# Patient Record
Sex: FEMALE | Race: WHITE | NOT HISPANIC OR LATINO | URBAN - METROPOLITAN AREA
[De-identification: names, ages, dates, MRNs, and addresses within clinical notes are randomized per-mention and may not be internally consistent; named-entity substitution may affect disease eponyms.]

---

## 2024-03-12 ENCOUNTER — OFFICE VISIT (OUTPATIENT)
Dept: FAMILY MEDICINE | Facility: CLINIC | Age: 44
End: 2024-03-12
Payer: COMMERCIAL

## 2024-03-12 VITALS
OXYGEN SATURATION: 98 % | HEART RATE: 74 BPM | RESPIRATION RATE: 18 BRPM | SYSTOLIC BLOOD PRESSURE: 110 MMHG | DIASTOLIC BLOOD PRESSURE: 76 MMHG | TEMPERATURE: 98 F | BODY MASS INDEX: 21.31 KG/M2 | HEIGHT: 67 IN | WEIGHT: 135.81 LBS

## 2024-03-12 DIAGNOSIS — G43.109 MIGRAINE WITH AURA AND WITHOUT STATUS MIGRAINOSUS, NOT INTRACTABLE: Primary | ICD-10-CM

## 2024-03-12 DIAGNOSIS — Z12.31 ENCOUNTER FOR SCREENING MAMMOGRAM FOR MALIGNANT NEOPLASM OF BREAST: ICD-10-CM

## 2024-03-12 DIAGNOSIS — F41.9 ANXIETY AND DEPRESSION: ICD-10-CM

## 2024-03-12 DIAGNOSIS — F32.A ANXIETY AND DEPRESSION: ICD-10-CM

## 2024-03-12 DIAGNOSIS — F90.0 ATTENTION DEFICIT HYPERACTIVITY DISORDER (ADHD), PREDOMINANTLY INATTENTIVE TYPE: ICD-10-CM

## 2024-03-12 DIAGNOSIS — Z00.00 PREVENTATIVE HEALTH CARE: ICD-10-CM

## 2024-03-12 DIAGNOSIS — F51.01 PRIMARY INSOMNIA: ICD-10-CM

## 2024-03-12 PROBLEM — G43.909 MIGRAINE: Status: ACTIVE | Noted: 2024-03-12

## 2024-03-12 PROCEDURE — 1159F MED LIST DOCD IN RCRD: CPT | Mod: CPTII,S$GLB,, | Performed by: FAMILY MEDICINE

## 2024-03-12 PROCEDURE — 1160F RVW MEDS BY RX/DR IN RCRD: CPT | Mod: CPTII,S$GLB,, | Performed by: FAMILY MEDICINE

## 2024-03-12 PROCEDURE — 99999 PR PBB SHADOW E&M-NEW PATIENT-LVL IV: CPT | Mod: PBBFAC,,, | Performed by: FAMILY MEDICINE

## 2024-03-12 PROCEDURE — 3074F SYST BP LT 130 MM HG: CPT | Mod: CPTII,S$GLB,, | Performed by: FAMILY MEDICINE

## 2024-03-12 PROCEDURE — 3078F DIAST BP <80 MM HG: CPT | Mod: CPTII,S$GLB,, | Performed by: FAMILY MEDICINE

## 2024-03-12 PROCEDURE — 3008F BODY MASS INDEX DOCD: CPT | Mod: CPTII,S$GLB,, | Performed by: FAMILY MEDICINE

## 2024-03-12 PROCEDURE — 99204 OFFICE O/P NEW MOD 45 MIN: CPT | Mod: S$GLB,,, | Performed by: FAMILY MEDICINE

## 2024-03-12 RX ORDER — DEXTROAMPHETAMINE SACCHARATE, AMPHETAMINE ASPARTATE, DEXTROAMPHETAMINE SULFATE AND AMPHETAMINE SULFATE 5; 5; 5; 5 MG/1; MG/1; MG/1; MG/1
TABLET ORAL
COMMUNITY
End: 2024-03-12

## 2024-03-12 RX ORDER — DEXTROAMPHETAMINE SACCHARATE, AMPHETAMINE ASPARTATE, DEXTROAMPHETAMINE SULFATE AND AMPHETAMINE SULFATE 5; 5; 5; 5 MG/1; MG/1; MG/1; MG/1
1 TABLET ORAL DAILY
Qty: 30 TABLET | Refills: 0 | Status: SHIPPED | OUTPATIENT
Start: 2024-03-12 | End: 2024-05-01 | Stop reason: SDUPTHER

## 2024-03-12 RX ORDER — BUTALBITAL, ACETAMINOPHEN AND CAFFEINE 50; 325; 40 MG/1; MG/1; MG/1
1 TABLET ORAL EVERY 4 HOURS PRN
COMMUNITY
End: 2024-05-01 | Stop reason: SDUPTHER

## 2024-03-12 RX ORDER — ZOLPIDEM TARTRATE 10 MG
TABLET ORAL
COMMUNITY
End: 2024-05-01 | Stop reason: SDUPTHER

## 2024-03-12 RX ORDER — ALPRAZOLAM 0.5 MG/1
TABLET ORAL
COMMUNITY
End: 2024-05-01 | Stop reason: SDUPTHER

## 2024-03-12 RX ORDER — CITALOPRAM HYDROBROMIDE 20 MG
TABLET ORAL
COMMUNITY
End: 2024-05-01 | Stop reason: SDUPTHER

## 2024-03-12 NOTE — PROGRESS NOTES
CHIEF COMPLAINT:  This is a 43-year-old female here for first visit to establish care and requesting refill of medications.    SUBJECTIVE:  The patient has not had a PCP since she was seen by a pediatrician.  She has been followed for her medical conditions by her neurologist who recently retired.  She brings in her medical records today.  Patient has a history of migraines which occur 2 to 3 times a month.  She treats migraines with Fioricet as needed.  She has Arnold-Chiari malformation for which she had surgery in .      Patient has depression with anxiety for which she has taken Celexa 20 mg daily since .  She previously took Wellbutrin which did not work well.  She takes alprazolam 0.5 mg 1/2 tablet at bedtime in order to calm her mind to sleep.  She occasionally takes alprazolam 0.5 mg 1/2 tablet during the day if needed.  She also takes Ambien 10 mg nightly for insomnia.  Patient has been diagnosed with ADHD inattentive type since she was an elementary school.  She takes Adderall 20 mg daily.  She has been unable to fill Adderall recently and notes that Adderall helps with her depression.    Pap smear years ago.  No previous mammogram.  TD booster May 2007.  COVID-19 vaccine .      Past Medical History:   Diagnosis Date    ADHD (attention deficit hyperactivity disorder), inattentive type     Anxiety and depression     Arnold-Chiari malformation     Migraine     Primary insomnia        Past Surgical History:   Procedure Laterality Date    CERVICAL FUSION       SECTION, LOW TRANSVERSE      CRANIAL SURGERY      Arnold Chiari malformation       Social History     Socioeconomic History    Marital status:    Tobacco Use    Smoking status: Never    Smokeless tobacco: Never   Substance and Sexual Activity    Alcohol use: Yes     Alcohol/week: 21.0 standard drinks of alcohol     Types: 21 Cans of beer per week   Social History Narrative    The patient is  and has  one child. She teaches elementary school.      Social Determinants of Health     Financial Resource Strain: Low Risk  (3/6/2024)    Overall Financial Resource Strain (CARDIA)     Difficulty of Paying Living Expenses: Not hard at all   Food Insecurity: No Food Insecurity (3/6/2024)    Hunger Vital Sign     Worried About Running Out of Food in the Last Year: Never true     Ran Out of Food in the Last Year: Never true   Transportation Needs: No Transportation Needs (3/6/2024)    PRAPARE - Transportation     Lack of Transportation (Medical): No     Lack of Transportation (Non-Medical): No   Physical Activity: Insufficiently Active (3/6/2024)    Exercise Vital Sign     Days of Exercise per Week: 5 days     Minutes of Exercise per Session: 20 min   Stress: Stress Concern Present (3/6/2024)    [employer] - Occupational Stress Questionnaire     Feeling of Stress : Very much   Social Connections: Unknown (3/6/2024)    Social Connection and Isolation Panel [NHANES]     Frequency of Communication with Friends and Family: Three times a week     Frequency of Social Gatherings with Friends and Family: Once a week     Active Member of Clubs or Organizations: Yes     Attends Club or Organization Meetings: 1 to 4 times per year     Marital Status:    Housing Stability: Low Risk  (3/6/2024)    Housing Stability Vital Sign     Unable to Pay for Housing in the Last Year: No     Number of Places Lived in the Last Year: 1     Unstable Housing in the Last Year: No       Family History   Problem Relation Age of Onset    Alcohol abuse Mother     Cancer Mother         Ocular cancer    Alcohol abuse Father     Hypertension Father     Stroke Father     ADD / ADHD Sister     Alzheimer's disease Maternal Grandmother     Lung cancer Maternal Grandfather     Kidney failure Paternal Grandmother     Stroke Paternal Grandfather        ROS:  GENERAL: Patient denies fever, chills, night sweats.  Patient denies  weight gain or loss. Patient denies anorexia, fatigue, weakness or swollen glands.  SKIN: Patient denies rash or hair loss.  HEENT: Patient denies sore throat, ear pain, hearing loss, nasal congestion, or runny nose. Patient denies visual disturbance, eye irritation or discharge.  LUNGS: Patient denies cough, wheeze or hemoptysis.  CARDIOVASCULAR: Patient denies chest pain, shortness of breath, palpitations, syncope or lower extremity edema.  GI: Patient denies abdominal pain, nausea, vomiting, diarrhea, constipation, blood in stool or melena.  GENITOURINARY: Patient denies pelvic pain, vaginal discharge, itch or odor. Patient denies irregular vaginal bleeding.  Patient denies dysuria, frequency, hematuria, nocturia, urgency or incontinence.  BREASTS: Patient denies breast pain, mass or nipple discharge.  MUSCULOSKELETAL: Patient denies joint pain, swelling, redness or warmth.  NEUROLOGIC: Patient denies headache, vertigo, paresthesias, weakness in limb, dysarthria, dysphagia or abnormality of gait.  PSYCHIATRIC: Patient denies memory loss memory loss.  Positive for anxiety, depression and insomnia.    OBJECTIVE:   GENERAL: Well-developed well-nourished pleasant white female alert and oriented x3, in no acute distress.  Memory, judgment and cognition without deficit.  Normal affect.  No hallucinations, delusions or flight of ideas.  SKIN: Clear without rash.  Normal color and tone.  HEENT: Eyes: Clear conjunctivae. No scleral icterus.  Pupils equal reactive to light and accommodation.  Ears: Clear canals. Clear TMs.  Nose: Without congestion.  Pharynx: Without injection or exudates.  NECK: Supple, normal range of motion.  No masses, lymphadenopathy or enlarged thyroid.  No JVD.  Carotids 2+ and equal.  No bruits.  LUNGS: Clear to auscultation.  Normal respiratory effort.  CARDIOVASCULAR:  Regular rhythm, normal S1, S2 without murmur, gallop or rub.  ABDOMEN: Normal appearance.  Active bowel sounds.  Soft, nontender  without mass or organomegaly.  No rebound or guarding.  EXTREMITIES: Without cyanosis, clubbing or edema.  Distal pulses 2+ and equal.  Normal range of motion in all extremities.  No joint effusion, erythema or warmth.  NEUROLOGIC:  Motor strength equal bilaterally.  Sensation normal to touch.  Gait without abnormality.  No tremor.    Reviewed medical records from neurologist Dr. Gagan Sheehan.  Discussed recommended alcohol consumption for women.    ASSESSMENT:  1. Migraine with aura and without status migrainosus, not intractable    2. Anxiety and depression    3. Attention deficit hyperactivity disorder (ADHD), predominantly inattentive type    4. Primary insomnia    5. Encounter for screening mammogram for malignant neoplasm of breast    6. Preventative health care      PLAN:   1. Fasting lab.  2. Mammogram.    3. Refill Adderall.    4. Decrease alcohol consumption:  1 drink per day.  5. Exercise 150 minutes per week.  6. Age-appropriate counseling.    7. Return to clinic for preventive health exam and Pap/pelvic.    45 minutes of total time spent on the encounter, which includes face to face time and non-face to face time preparing to see the patient (eg, review of tests), Obtaining and/or reviewing separately obtained history, Documenting clinical information in the electronic or other health record, Independently interpreting results (not separately reported) and communicating results to the patient/family/caregiver, or Care coordination (not separately reported).     This note is generated with speech recognition software and is subject to transcription error and sound alike phrases that may be missed by proofreading.

## 2024-03-14 ENCOUNTER — HOSPITAL ENCOUNTER (OUTPATIENT)
Dept: RADIOLOGY | Facility: HOSPITAL | Age: 44
Discharge: HOME | End: 2024-03-14
Attending: FAMILY MEDICINE
Payer: COMMERCIAL

## 2024-03-14 DIAGNOSIS — Z12.31 ENCOUNTER FOR SCREENING MAMMOGRAM FOR MALIGNANT NEOPLASM OF BREAST: ICD-10-CM

## 2024-03-14 PROCEDURE — 77067 SCR MAMMO BI INCL CAD: CPT | Mod: TC,PN

## 2024-03-14 PROCEDURE — 77063 BREAST TOMOSYNTHESIS BI: CPT | Mod: 26,,, | Performed by: RADIOLOGY

## 2024-03-14 PROCEDURE — 77067 SCR MAMMO BI INCL CAD: CPT | Mod: 26,,, | Performed by: RADIOLOGY

## 2024-05-01 ENCOUNTER — OFFICE VISIT (OUTPATIENT)
Dept: FAMILY MEDICINE | Facility: CLINIC | Age: 44
End: 2024-05-01
Payer: COMMERCIAL

## 2024-05-01 VITALS
DIASTOLIC BLOOD PRESSURE: 72 MMHG | TEMPERATURE: 98 F | HEIGHT: 67 IN | SYSTOLIC BLOOD PRESSURE: 114 MMHG | WEIGHT: 132.94 LBS | HEART RATE: 90 BPM | BODY MASS INDEX: 20.87 KG/M2 | RESPIRATION RATE: 18 BRPM | OXYGEN SATURATION: 97 %

## 2024-05-01 DIAGNOSIS — F41.9 ANXIETY AND DEPRESSION: ICD-10-CM

## 2024-05-01 DIAGNOSIS — Z12.4 CERVICAL CANCER SCREENING: ICD-10-CM

## 2024-05-01 DIAGNOSIS — F32.A ANXIETY AND DEPRESSION: ICD-10-CM

## 2024-05-01 DIAGNOSIS — Z76.0 MEDICATION REFILL: ICD-10-CM

## 2024-05-01 DIAGNOSIS — F90.0 ATTENTION DEFICIT HYPERACTIVITY DISORDER (ADHD), PREDOMINANTLY INATTENTIVE TYPE: ICD-10-CM

## 2024-05-01 DIAGNOSIS — F51.01 PRIMARY INSOMNIA: ICD-10-CM

## 2024-05-01 DIAGNOSIS — Z00.00 PREVENTATIVE HEALTH CARE: Primary | ICD-10-CM

## 2024-05-01 DIAGNOSIS — G43.109 MIGRAINE WITH AURA AND WITHOUT STATUS MIGRAINOSUS, NOT INTRACTABLE: ICD-10-CM

## 2024-05-01 PROCEDURE — 3008F BODY MASS INDEX DOCD: CPT | Mod: CPTII,S$GLB,, | Performed by: REGISTERED NURSE

## 2024-05-01 PROCEDURE — 99999 PR PBB SHADOW E&M-EST. PATIENT-LVL III: CPT | Mod: PBBFAC,,, | Performed by: REGISTERED NURSE

## 2024-05-01 PROCEDURE — 3078F DIAST BP <80 MM HG: CPT | Mod: CPTII,S$GLB,, | Performed by: REGISTERED NURSE

## 2024-05-01 PROCEDURE — 3074F SYST BP LT 130 MM HG: CPT | Mod: CPTII,S$GLB,, | Performed by: REGISTERED NURSE

## 2024-05-01 PROCEDURE — 87624 HPV HI-RISK TYP POOLED RSLT: CPT | Performed by: REGISTERED NURSE

## 2024-05-01 PROCEDURE — 99396 PREV VISIT EST AGE 40-64: CPT | Mod: S$GLB,,, | Performed by: REGISTERED NURSE

## 2024-05-01 PROCEDURE — 88175 CYTOPATH C/V AUTO FLUID REDO: CPT | Performed by: REGISTERED NURSE

## 2024-05-01 RX ORDER — ZOLPIDEM TARTRATE 10 MG
10 TABLET ORAL NIGHTLY PRN
Qty: 30 TABLET | Refills: 2 | Status: SHIPPED | OUTPATIENT
Start: 2024-05-01

## 2024-05-01 RX ORDER — BUTALBITAL, ACETAMINOPHEN AND CAFFEINE 50; 325; 40 MG/1; MG/1; MG/1
1 TABLET ORAL EVERY 6 HOURS PRN
Qty: 30 TABLET | Refills: 1 | Status: SHIPPED | OUTPATIENT
Start: 2024-05-01

## 2024-05-01 RX ORDER — DEXTROAMPHETAMINE SACCHARATE, AMPHETAMINE ASPARTATE, DEXTROAMPHETAMINE SULFATE AND AMPHETAMINE SULFATE 5; 5; 5; 5 MG/1; MG/1; MG/1; MG/1
1 TABLET ORAL DAILY
Qty: 30 TABLET | Refills: 0 | Status: SHIPPED | OUTPATIENT
Start: 2024-07-01

## 2024-05-01 RX ORDER — CITALOPRAM HYDROBROMIDE 20 MG
20 TABLET ORAL DAILY
Qty: 30 TABLET | Refills: 6 | Status: SHIPPED | OUTPATIENT
Start: 2024-05-01

## 2024-05-01 RX ORDER — DEXTROAMPHETAMINE SACCHARATE, AMPHETAMINE ASPARTATE, DEXTROAMPHETAMINE SULFATE AND AMPHETAMINE SULFATE 5; 5; 5; 5 MG/1; MG/1; MG/1; MG/1
1 TABLET ORAL DAILY
Qty: 30 TABLET | Refills: 0 | Status: SHIPPED | OUTPATIENT
Start: 2024-06-01

## 2024-05-01 RX ORDER — DEXTROAMPHETAMINE SACCHARATE, AMPHETAMINE ASPARTATE, DEXTROAMPHETAMINE SULFATE AND AMPHETAMINE SULFATE 5; 5; 5; 5 MG/1; MG/1; MG/1; MG/1
1 TABLET ORAL DAILY
Qty: 30 TABLET | Refills: 0 | Status: SHIPPED | OUTPATIENT
Start: 2024-05-01

## 2024-05-01 RX ORDER — ALPRAZOLAM 0.5 MG/1
0.5 TABLET ORAL DAILY PRN
Qty: 30 TABLET | Refills: 2 | Status: SHIPPED | OUTPATIENT
Start: 2024-05-01

## 2024-05-01 NOTE — PROGRESS NOTES
"SUBJECTIVE:     Oralia Cruz  MRN:  [id number]  43 y.o. female    CHIEF COMPLAINT:   PREVENTATIVE HEALTH EXAM    HPI:    Oralia Cruz is here for her annual wellness exam, has fasted today for bloodwork.  I have reviewed the patient's medical history in detail and updated the computerized patient record.  History obtained from the patient.    HEALTHCARE MAINTENANCE:    COMPLETED:  Health Maintenance Topics with due status: Not Due       Topic Last Completion Date    Mammogram 03/14/2024    Influenza Vaccine Not Due       DUE:  Health Maintenance Due   Topic Date Due    Cervical Cancer Screening  Never done    TETANUS VACCINE  03/11/2017    COVID-19 Vaccine (3 - 2023-24 season) 09/01/2023         REVIEW OF SYSTEMS:  Review of Systems   Constitutional:  Negative for activity change, appetite change, chills, diaphoresis, fatigue, fever and unexpected weight change.        Wt Readings from Last 2 Encounters:  05/01/24 0956 : 60.3 kg (132 lb 15 oz)  03/12/24 1550 : 61.6 kg (135 lb 12.9 oz)   HENT: Negative.     Eyes:  Negative for discharge and visual disturbance.   Respiratory:  Negative for cough, shortness of breath and wheezing.    Cardiovascular:  Negative for chest pain, palpitations and leg swelling.   Gastrointestinal: Negative.    Genitourinary: Negative.    Musculoskeletal: Negative.    Integumentary:  Negative for rash and mole/lesion.   Neurological:  Positive for headaches (tx with Fioricet as needed, takes med ~ 2 times per month). Negative for vertigo, seizures, syncope and numbness.        Req med refills on chronic meds currently taking.  Has been on several years per Neurology but he has since retired and she needs refills.   Hematological: Negative.    Psychiatric/Behavioral:  Positive for depression and sleep disturbance. The patient is nervous/anxious.         On medication as ordered, working well.  Also taking ADD medication chronically, doing well with focus/concentration and "to keep my " "mind straight".   Breast: negative.        ALLERGIES:  Review of patient's allergies indicates:  No Known Allergies      CURRENT PROBLEM LIST:  Patient Active Problem List   Diagnosis    Migraine    Anxiety and depression    Attention deficit hyperactivity disorder (ADHD), predominantly inattentive type    Primary insomnia       CURRENT MEDICATIONS:    Current Outpatient Medications:     AMBIEN 10 mg Tab, Take 1 tablet (10 mg total) by mouth nightly as needed (for sleep)., Disp: 30 tablet, Rfl: 2    butalbital-acetaminophen-caffeine -40 mg (FIORICET, ESGIC) -40 mg per tablet, Take 1 tablet by mouth every 6 (six) hours as needed for Headaches., Disp: 30 tablet, Rfl: 1    CELEXA 20 mg tablet, Take 1 tablet (20 mg total) by mouth once daily., Disp: 30 tablet, Rfl: 6    dextroamphetamine-amphetamine (ADDERALL) 20 mg tablet, Take 1 tablet by mouth once daily., Disp: 30 tablet, Rfl: 0    XANAX 0.5 mg tablet, Take 1 tablet (0.5 mg total) by mouth daily as needed for Anxiety or Insomnia., Disp: 30 tablet, Rfl: 2      HISTORY:    PAST MEDICAL HISTORY:  Past Medical History:   Diagnosis Date    ADHD (attention deficit hyperactivity disorder), inattentive type     Anxiety and depression     Arnold-Chiari malformation     Migraine     Primary insomnia        PAST SURGICAL HISTORY:  Past Surgical History:   Procedure Laterality Date    CERVICAL FUSION       SECTION, LOW TRANSVERSE      CRANIAL SURGERY      Arnold Chiari malformation       FAMILY HISTORY:  Family History   Problem Relation Name Age of Onset    Alcohol abuse Mother      Cancer Mother          Ocular cancer    Alcohol abuse Father      Hypertension Father      Stroke Father      ADD / ADHD Sister      Alzheimer's disease Maternal Grandmother      Lung cancer Maternal Grandfather      Kidney failure Paternal Grandmother      Stroke Paternal Grandfather         SOCIAL HISTORY:  Social History     Socioeconomic History    Marital status: " "   Tobacco Use    Smoking status: Never    Smokeless tobacco: Never   Substance and Sexual Activity    Alcohol use: Yes     Alcohol/week: 21.0 standard drinks of alcohol     Types: 21 Cans of beer per week    Drug use: Never   Social History Narrative    The patient is  and has one child. She teaches elementary school.      Social Determinants of Health     Financial Resource Strain: Low Risk  (3/6/2024)    Overall Financial Resource Strain (CARDIA)     Difficulty of Paying Living Expenses: Not hard at all   Food Insecurity: No Food Insecurity (3/6/2024)    Hunger Vital Sign     Worried About Running Out of Food in the Last Year: Never true     Ran Out of Food in the Last Year: Never true   Transportation Needs: No Transportation Needs (3/6/2024)    PRAPARE - Transportation     Lack of Transportation (Medical): No     Lack of Transportation (Non-Medical): No   Physical Activity: Insufficiently Active (3/6/2024)    Exercise Vital Sign     Days of Exercise per Week: 5 days     Minutes of Exercise per Session: 20 min   Stress: Stress Concern Present (3/6/2024)    [employer] - Occupational Stress Questionnaire     Feeling of Stress : Very much   Housing Stability: Low Risk  (3/6/2024)    Housing Stability Vital Sign     Unable to Pay for Housing in the Last Year: No     Number of Places Lived in the Last Year: 1     Unstable Housing in the Last Year: No       OBJECTIVE:     VITAL SIGNS:  Vitals:    05/01/24 0956   BP: 114/72   Pulse: 90   Resp: 18   Temp: 97.7 °F (36.5 °C)   TempSrc: Oral   SpO2: 97%   Weight: 60.3 kg (132 lb 15 oz)   Height: 5' 7" (1.702 m)       CURRENT BMI:   Body mass index is 20.82 kg/m².    PHYSICAL EXAM:  Physical Exam  Constitutional:       General: She is not in acute distress.     Appearance: She is well-developed. She is not diaphoretic.   HENT:      Head: Normocephalic and atraumatic.      Right Ear: Tympanic membrane, ear canal and external ear " normal. There is no impacted cerumen.      Left Ear: Tympanic membrane, ear canal and external ear normal. There is no impacted cerumen.      Nose: Nose normal. No nasal deformity, mucosal edema, congestion or rhinorrhea.      Mouth/Throat:      Mouth: Mucous membranes are moist. Mucous membranes are not dry and not cyanotic. No oral lesions.      Dentition: Normal dentition.      Pharynx: Oropharynx is clear. Uvula midline. No oropharyngeal exudate, posterior oropharyngeal erythema or uvula swelling.      Tonsils: No tonsillar abscesses.   Eyes:      General: Lids are normal. Lids are everted, no foreign bodies appreciated. No scleral icterus.        Right eye: No discharge.         Left eye: No discharge.      Conjunctiva/sclera: Conjunctivae normal.      Right eye: Right conjunctiva is not injected. No exudate.     Left eye: Left conjunctiva is not injected. No exudate.     Pupils: Pupils are equal, round, and reactive to light.   Neck:      Thyroid: No thyroid mass or thyromegaly.      Vascular: No carotid bruit or JVD.      Trachea: No tracheal deviation.   Cardiovascular:      Rate and Rhythm: Normal rate and regular rhythm.      Pulses: Normal pulses.      Heart sounds: Normal heart sounds. No murmur heard.     No friction rub. No gallop.   Pulmonary:      Effort: Pulmonary effort is normal. No respiratory distress.      Breath sounds: Normal breath sounds. No stridor. No wheezing.   Chest:      Chest wall: No tenderness.   Abdominal:      General: Bowel sounds are normal. There is no distension.      Palpations: Abdomen is soft. There is no mass.      Tenderness: There is no abdominal tenderness. There is no guarding or rebound.   Genitourinary:     Vagina: No vaginal discharge, erythema or bleeding.      Cervix: Normal. No cervical motion tenderness, discharge, friability, lesion, erythema or cervical bleeding.      Uterus: Normal.       Adnexa: Right adnexa normal and left adnexa normal.      Rectum:  Normal.   Musculoskeletal:         General: No swelling, tenderness or deformity. Normal range of motion.      Cervical back: Normal range of motion and neck supple. No edema or erythema. Normal range of motion.   Lymphadenopathy:      Cervical: No cervical adenopathy.   Skin:     General: Skin is warm and dry.      Capillary Refill: Capillary refill takes less than 2 seconds.      Coloration: Skin is not pale.      Findings: No bruising, erythema or rash.   Neurological:      Mental Status: She is alert and oriented to person, place, and time.      Sensory: No sensory deficit.      Motor: No weakness, tremor, atrophy or abnormal muscle tone.      Coordination: Coordination normal.      Gait: Gait normal.      Deep Tendon Reflexes: Reflexes are normal and symmetric.   Psychiatric:         Mood and Affect: Mood normal.         Speech: Speech normal.         Behavior: Behavior normal.         Thought Content: Thought content normal.         Cognition and Memory: Memory is not impaired.         Judgment: Judgment normal.         ~~~~~~~~~~~~~~~~~~~~~~~~~~~~~~~~~~~~~~~~~~~~~~~    LABS REVIEWED:    CBC:  Lab Results   Component Value Date    WBC 3.57 (L) 03/14/2024    RBC 3.98 (L) 03/14/2024    HGB 12.8 03/14/2024    HCT 37.7 03/14/2024    MCV 95 03/14/2024    MCH 32.2 (H) 03/14/2024    MCHC 34.0 03/14/2024    RDW 12.2 03/14/2024     03/14/2024    MPV 10.4 03/14/2024    GRAN 1.7 (L) 03/14/2024    GRAN 46.7 03/14/2024    LYMPH 1.5 03/14/2024    LYMPH 41.2 03/14/2024    MONO 0.4 03/14/2024    MONO 10.4 03/14/2024    EOS 0.0 03/14/2024    BASO 0.04 03/14/2024    EOSINOPHIL 0.6 03/14/2024    BASOPHIL 1.1 03/14/2024       CHEMISTRY:  Sodium   Date Value Ref Range Status   03/14/2024 138 136 - 145 mmol/L Final     Potassium   Date Value Ref Range Status   03/14/2024 3.9 3.5 - 5.1 mmol/L Final     Chloride   Date Value Ref Range Status   03/14/2024 104 95 - 110 mmol/L Final     CO2   Date Value Ref Range Status    03/14/2024 23 23 - 29 mmol/L Final     Glucose   Date Value Ref Range Status   03/14/2024 80 70 - 110 mg/dL Final     BUN   Date Value Ref Range Status   03/14/2024 11 6 - 20 mg/dL Final     Creatinine   Date Value Ref Range Status   03/14/2024 0.8 0.5 - 1.4 mg/dL Final     Calcium   Date Value Ref Range Status   03/14/2024 9.5 8.7 - 10.5 mg/dL Final     Total Protein   Date Value Ref Range Status   03/14/2024 6.9 6.0 - 8.4 g/dL Final     Albumin   Date Value Ref Range Status   03/14/2024 4.2 3.5 - 5.2 g/dL Final     Total Bilirubin   Date Value Ref Range Status   03/14/2024 0.8 0.1 - 1.0 mg/dL Final     Comment:     For infants and newborns, interpretation of results should be based  on gestational age, weight and in agreement with clinical  observations.    Premature Infant recommended reference ranges:  Up to 24 hours.............<8.0 mg/dL  Up to 48 hours............<12.0 mg/dL  3-5 days..................<15.0 mg/dL  6-29 days.................<15.0 mg/dL       Alkaline Phosphatase   Date Value Ref Range Status   03/14/2024 42 (L) 55 - 135 U/L Final     AST   Date Value Ref Range Status   03/14/2024 16 10 - 40 U/L Final     ALT   Date Value Ref Range Status   03/14/2024 8 (L) 10 - 44 U/L Final     Anion Gap   Date Value Ref Range Status   03/14/2024 11 8 - 16 mmol/L Final     eGFR   Date Value Ref Range Status   03/14/2024 >60.0 >60 mL/min/1.73 m^2 Final       LIPID PANEL:  Lab Results   Component Value Date    CHOL 190 03/14/2024     Lab Results   Component Value Date    TRIG 62 03/14/2024     Lab Results   Component Value Date    HDL 63 03/14/2024     Lab Results   Component Value Date    LDLCALC 114.6 03/14/2024       THYROID:  Lab Results   Component Value Date    TSH 1.973 03/14/2024         ASSESSMENT:     1. Preventative health care  -     Liquid-Based Pap Smear, Screening  -     HPV High Risk Genotypes, PCR    2. Anxiety and depression ---- chronic issue on med & doing well, refilled  -     CELEXA 20 mg  tablet; Take 1 tablet (20 mg total) by mouth once daily.  Dispense: 30 tablet; Refill: 6  -     XANAX 0.5 mg tablet; Take 1 tablet (0.5 mg total) by mouth daily as needed for Anxiety or Insomnia.  Dispense: 30 tablet; Refill: 2    3. Primary insomnia ---- takes chronically, working well, refilled  -     AMBIEN 10 mg Tab; Take 1 tablet (10 mg total) by mouth nightly as needed (for sleep).  Dispense: 30 tablet; Refill: 2    4. Attention deficit hyperactivity disorder (ADHD), predominantly inattentive type ---- takes once daily w/out any reported s/e, has been on chronically and doing well  -     dextroamphetamine-amphetamine (ADDERALL) 20 mg tablet; Take 1 tablet by mouth once daily.  Dispense: 30 tablet; Refill: 0  -     dextroamphetamine-amphetamine (ADDERALL) 20 mg tablet; Take 1 tablet by mouth once daily.  Dispense: 30 tablet; Refill: 0  -     dextroamphetamine-amphetamine (ADDERALL) 20 mg tablet; Take 1 tablet by mouth once daily.  Dispense: 30 tablet; Refill: 0    5. Migraine with aura and without status migrainosus, not intractable ---- takes about twice per month, nothing excessive  -     butalbital-acetaminophen-caffeine -40 mg (FIORICET, ESGIC) -40 mg per tablet; Take 1 tablet by mouth every 6 (six) hours as needed for Headaches.  Dispense: 30 tablet; Refill: 1    6. Cervical cancer screening  -     Liquid-Based Pap Smear, Screening  -     HPV High Risk Genotypes, PCR    7. Medication refill  -     AMBIEN 10 mg Tab; Take 1 tablet (10 mg total) by mouth nightly as needed (for sleep).  Dispense: 30 tablet; Refill: 2  -     butalbital-acetaminophen-caffeine -40 mg (FIORICET, ESGIC) -40 mg per tablet; Take 1 tablet by mouth every 6 (six) hours as needed for Headaches.  Dispense: 30 tablet; Refill: 1  -     CELEXA 20 mg tablet; Take 1 tablet (20 mg total) by mouth once daily.  Dispense: 30 tablet; Refill: 6  -     XANAX 0.5 mg tablet; Take 1 tablet (0.5 mg total) by mouth daily as needed  for Anxiety or Insomnia.  Dispense: 30 tablet; Refill: 2  -     dextroamphetamine-amphetamine (ADDERALL) 20 mg tablet; Take 1 tablet by mouth once daily.  Dispense: 30 tablet; Refill: 0  -     dextroamphetamine-amphetamine (ADDERALL) 20 mg tablet; Take 1 tablet by mouth once daily.  Dispense: 30 tablet; Refill: 0  -     dextroamphetamine-amphetamine (ADDERALL) 20 mg tablet; Take 1 tablet by mouth once daily.  Dispense: 30 tablet; Refill: 0      PLAN:     Healthy dietary and lifestyle changes.  Defer lab today, last done 3/2024.  Medications refilled, no red flags per .  Pap pending.  See PCP for ADD follow-up in 3 months.  RTC as directed and/or prn.        SHEILA Hodges  [facility]       Patient Care Team:  Andreea Ferrer MD as PCP - General (Family Medicine)  Sumanth Kingsley NP as Nurse Practitioner (Family Medicine)

## 2024-11-06 NOTE — PROGRESS NOTES
Patient ID: Oralia Cruz is a 44 y.o. female.    Chief Complaint: Follow-up and Medication Refill      History of Present Illness    - Ms. Cruz presents for follow-up and medication refills.    Ms. Cruz presents for follow-up, initially believing it had been 6 months since her last visit. Upon review, it was determined that it had actually been slightly over 6 months since her last appointment in March. Ms. Cruz reports tolerating her current medication doses well. She indicates being able to obtain her medication only at the [facility] at the [facility], which she describes as being located on a small [address] near the [facility]. Ms. Cruz requests refills for Adderall, Celexa, and Ambien.      ROS:  General: denies fever, denies chills, denies fatigue, denies weight gain, denies weight loss  Eyes: denies vision changes, denies redness, denies discharge  ENT: denies ear pain, denies nasal congestion, denies sore throat  Cardiovascular: denies chest pain, denies palpitations, denies lower extremity edema  Respiratory: denies cough, denies shortness of breath  Gastrointestinal: denies abdominal pain, denies nausea, denies vomiting, denies diarrhea, denies constipation, denies blood in stool  Genitourinary: denies dysuria, denies hematuria, denies frequency  Musculoskeletal: denies joint pain, denies muscle pain  Skin: denies rash, denies lesion  Neurological: denies headache, denies dizziness, denies numbness, denies tingling  Psychiatric: denies anxiety, denies depression, denies sleep difficulty            Physical Exam    General: In no acute distress.  Head: Normocephalic. Non traumatic.  Eyes: PERRLA. EOMs full. Conjunctivae clear. Fundi grossly normal.  Ears: EACs clear. TMs normal.  Nose: Mucosa pink. Mucosa moist. No obstruction.  Throat: Clear. No exudates. No lesions.  Neck: Supple. No masses. No thyromegaly. No bruits.  Chest: Lungs clear. No rales. No rhonchi. No wheezes.  Heart:  "RRR. No murmurs. No rubs. No gallops.  Abdomen: Soft. No tenderness. No masses. BS normal.  : Normal external genitalia. No lesions. No discharge. No hernias  noted.  Back: Normal curvature. No scoliosis. No tenderness.  Extremities: Warm. Well perfused. No upper extremity edema. No lower extremity edema. FROM. No deformities. No joint erythema.  Neuro: No focal deficits appreciated. Good muscle tone. Normal response to visual stimuli. Normal response to auditory stimuli.  Skin: Normal. No rashes. No lesions noted.            Current Outpatient Medications:     butalbital-acetaminophen-caffeine -40 mg (FIORICET, ESGIC) -40 mg per tablet, Take 1 tablet by mouth every 6 (six) hours as needed for Headaches., Disp: 30 tablet, Rfl: 1    XANAX 0.5 mg tablet, Take 1 tablet (0.5 mg total) by mouth daily as needed for Anxiety or Insomnia., Disp: 30 tablet, Rfl: 2    AMBIEN 10 mg Tab, Take 1 tablet (10 mg total) by mouth nightly as needed (for sleep)., Disp: 30 tablet, Rfl: 2    CELEXA 20 mg tablet, Take 1 tablet (20 mg total) by mouth once daily., Disp: 30 tablet, Rfl: 6    dextroamphetamine-amphetamine (ADDERALL) 20 mg tablet, Take 1 tablet by mouth once daily., Disp: 30 tablet, Rfl: 0            VITAL SIGNS:  Vitals:    11/07/24 1444   BP: 106/68   Pulse: 88   Resp: 18   Temp: 97.9 °F (36.6 °C)   Weight: 58.1 kg (128 lb 1.4 oz)   Height: 5' 7" (1.702 m)       CURRENT BMI:   Body mass index is 20.06 kg/m².    LABS REVIEWED:    CBC:  Lab Results   Component Value Date    WBC 3.57 (L) 03/14/2024    RBC 3.98 (L) 03/14/2024    HGB 12.8 03/14/2024    HCT 37.7 03/14/2024    MCV 95 03/14/2024    MCH 32.2 (H) 03/14/2024    MCHC 34.0 03/14/2024    RDW 12.2 03/14/2024     03/14/2024    MPV 10.4 03/14/2024    GRAN 1.7 (L) 03/14/2024    GRAN 46.7 03/14/2024    LYMPH 1.5 03/14/2024    LYMPH 41.2 03/14/2024    MONO 0.4 03/14/2024    MONO 10.4 03/14/2024    EOS 0.0 03/14/2024    BASO 0.04 03/14/2024    EOSINOPHIL 0.6 " 03/14/2024    BASOPHIL 1.1 03/14/2024       CHEMISTRY:  Sodium   Date Value Ref Range Status   03/14/2024 138 136 - 145 mmol/L Final     Potassium   Date Value Ref Range Status   03/14/2024 3.9 3.5 - 5.1 mmol/L Final     Chloride   Date Value Ref Range Status   03/14/2024 104 95 - 110 mmol/L Final     CO2   Date Value Ref Range Status   03/14/2024 23 23 - 29 mmol/L Final     Glucose   Date Value Ref Range Status   03/14/2024 80 70 - 110 mg/dL Final     BUN   Date Value Ref Range Status   03/14/2024 11 6 - 20 mg/dL Final     Creatinine   Date Value Ref Range Status   03/14/2024 0.8 0.5 - 1.4 mg/dL Final     Calcium   Date Value Ref Range Status   03/14/2024 9.5 8.7 - 10.5 mg/dL Final     Total Protein   Date Value Ref Range Status   03/14/2024 6.9 6.0 - 8.4 g/dL Final     Albumin   Date Value Ref Range Status   03/14/2024 4.2 3.5 - 5.2 g/dL Final     Total Bilirubin   Date Value Ref Range Status   03/14/2024 0.8 0.1 - 1.0 mg/dL Final     Comment:     For infants and newborns, interpretation of results should be based  on gestational age, weight and in agreement with clinical  observations.    Premature Infant recommended reference ranges:  Up to 24 hours.............<8.0 mg/dL  Up to 48 hours............<12.0 mg/dL  3-5 days..................<15.0 mg/dL  6-29 days.................<15.0 mg/dL       Alkaline Phosphatase   Date Value Ref Range Status   03/14/2024 42 (L) 55 - 135 U/L Final     AST   Date Value Ref Range Status   03/14/2024 16 10 - 40 U/L Final     ALT   Date Value Ref Range Status   03/14/2024 8 (L) 10 - 44 U/L Final     Anion Gap   Date Value Ref Range Status   03/14/2024 11 8 - 16 mmol/L Final     eGFR   Date Value Ref Range Status   03/14/2024 >60.0 >60 mL/min/1.73 m^2 Final       LIPID PANEL:  Lab Results   Component Value Date    CHOL 190 03/14/2024     Lab Results   Component Value Date    TRIG 62 03/14/2024     Lab Results   Component Value Date    HDL 63 03/14/2024     Lab Results   Component  "Value Date    LDLCALC 114.6 03/14/2024       THYROID:  Lab Results   Component Value Date    TSH 1.973 03/14/2024       DIABETES:  No results found for: "LABA1C", "HGBA1C"  No results found for: "MICALBCREAT"    Assessment and Plan     Assessment & Plan    ATTENTION DEFICIT HYPERACTIVITY DISORDER (ADHD):  - Continued Adderall, refilled.    DEPRESSION:  - Continued Celexa, refilled.    INSOMNIA:  - Continued Ambien, refilled.    MEDICATIONS/SUPPLEMENTS:  - Assessed patient's current medication regimen and determined refills were appropriate.  - Evaluated patient's ability to obtain medications, noting [facility] at [facility] as the source.    OTHER INSTRUCTIONS:  - Conducted brief physical exam, including lung auscultation.    FOLLOW UP:  - Follow up in 6 months for medication review and refills.          1. Attention deficit hyperactivity disorder (ADHD), predominantly inattentive type  Comments:  Will refill Adderall  Orders:  -     dextroamphetamine-amphetamine (ADDERALL) 20 mg tablet; Take 1 tablet by mouth once daily.  Dispense: 30 tablet; Refill: 0    2. Medication refill  Comments:  Will refill medications  Orders:  -     dextroamphetamine-amphetamine (ADDERALL) 20 mg tablet; Take 1 tablet by mouth once daily.  Dispense: 30 tablet; Refill: 0  -     CELEXA 20 mg tablet; Take 1 tablet (20 mg total) by mouth once daily.  Dispense: 30 tablet; Refill: 6  -     AMBIEN 10 mg Tab; Take 1 tablet (10 mg total) by mouth nightly as needed (for sleep).  Dispense: 30 tablet; Refill: 2    3. Anxiety and depression  Comments:  Will refill Celexa  Orders:  -     CELEXA 20 mg tablet; Take 1 tablet (20 mg total) by mouth once daily.  Dispense: 30 tablet; Refill: 6    4. Primary insomnia  Comments:  Will refill Ambien  Orders:  -     AMBIEN 10 mg Tab; Take 1 tablet (10 mg total) by mouth nightly as needed (for sleep).  Dispense: 30 tablet; Refill: 2           No follow-ups on file.  34 of total time spent on the encounter, " which includes face to face time and non-face to face time preparing to see the patient. This includes obtaining and/or reviewing separately obtained history, performing a medically appropriate examination and/or evaluation, and counseling and educating the patient/family/caregiver. Includes documenting clinical information in the electronic or other health record, independently interpreting results (not separately reported) and communicating results to the patient/family/caregiver, with care coordination (not separately reported). Medications, tests and/or procedures ordered as necessary along with referring and communicating with other health professionals (when not separately reported).      This note was generated with the assistance of ambient listening technology. Verbal consent was obtained by the patient and accompanying visitor(s) for the recording of patient appointment to facilitate this note. I attest to having reviewed and edited the generated note for accuracy, though some syntax or spelling errors may persist. Please contact the author of this note for any clarification.

## 2024-11-07 ENCOUNTER — OFFICE VISIT (OUTPATIENT)
Dept: FAMILY MEDICINE | Facility: CLINIC | Age: 44
End: 2024-11-07
Payer: COMMERCIAL

## 2024-11-07 VITALS
HEART RATE: 88 BPM | DIASTOLIC BLOOD PRESSURE: 68 MMHG | HEIGHT: 67 IN | SYSTOLIC BLOOD PRESSURE: 106 MMHG | RESPIRATION RATE: 18 BRPM | WEIGHT: 128.06 LBS | BODY MASS INDEX: 20.1 KG/M2 | TEMPERATURE: 98 F

## 2024-11-07 DIAGNOSIS — Z76.0 MEDICATION REFILL: ICD-10-CM

## 2024-11-07 DIAGNOSIS — F41.9 ANXIETY AND DEPRESSION: ICD-10-CM

## 2024-11-07 DIAGNOSIS — F32.A ANXIETY AND DEPRESSION: ICD-10-CM

## 2024-11-07 DIAGNOSIS — F51.01 PRIMARY INSOMNIA: ICD-10-CM

## 2024-11-07 DIAGNOSIS — F90.0 ATTENTION DEFICIT HYPERACTIVITY DISORDER (ADHD), PREDOMINANTLY INATTENTIVE TYPE: ICD-10-CM

## 2024-11-07 PROCEDURE — 3078F DIAST BP <80 MM HG: CPT | Mod: CPTII,S$GLB,, | Performed by: INTERNAL MEDICINE

## 2024-11-07 PROCEDURE — 99214 OFFICE O/P EST MOD 30 MIN: CPT | Mod: S$GLB,,, | Performed by: INTERNAL MEDICINE

## 2024-11-07 PROCEDURE — 3074F SYST BP LT 130 MM HG: CPT | Mod: CPTII,S$GLB,, | Performed by: INTERNAL MEDICINE

## 2024-11-07 PROCEDURE — G2211 COMPLEX E/M VISIT ADD ON: HCPCS | Mod: S$GLB,,, | Performed by: INTERNAL MEDICINE

## 2024-11-07 PROCEDURE — 1159F MED LIST DOCD IN RCRD: CPT | Mod: CPTII,S$GLB,, | Performed by: INTERNAL MEDICINE

## 2024-11-07 PROCEDURE — 99999 PR PBB SHADOW E&M-EST. PATIENT-LVL III: CPT | Mod: PBBFAC,,, | Performed by: INTERNAL MEDICINE

## 2024-11-07 PROCEDURE — 3008F BODY MASS INDEX DOCD: CPT | Mod: CPTII,S$GLB,, | Performed by: INTERNAL MEDICINE

## 2024-11-07 RX ORDER — DEXTROAMPHETAMINE SACCHARATE, AMPHETAMINE ASPARTATE, DEXTROAMPHETAMINE SULFATE AND AMPHETAMINE SULFATE 5; 5; 5; 5 MG/1; MG/1; MG/1; MG/1
1 TABLET ORAL DAILY
Qty: 30 TABLET | Refills: 0 | Status: SHIPPED | OUTPATIENT
Start: 2024-11-07

## 2024-11-07 RX ORDER — CITALOPRAM HYDROBROMIDE 20 MG
20 TABLET ORAL DAILY
Qty: 30 TABLET | Refills: 6 | Status: SHIPPED | OUTPATIENT
Start: 2024-11-07

## 2024-11-07 RX ORDER — ZOLPIDEM TARTRATE 10 MG
10 TABLET ORAL NIGHTLY PRN
Qty: 30 TABLET | Refills: 2 | Status: SHIPPED | OUTPATIENT
Start: 2024-11-07

## 2024-11-18 ENCOUNTER — PATIENT MESSAGE (OUTPATIENT)
Dept: FAMILY MEDICINE | Facility: CLINIC | Age: 44
End: 2024-11-18
Payer: COMMERCIAL

## 2024-11-18 DIAGNOSIS — F32.A ANXIETY AND DEPRESSION: ICD-10-CM

## 2024-11-18 DIAGNOSIS — Z76.0 MEDICATION REFILL: ICD-10-CM

## 2024-11-18 DIAGNOSIS — F51.01 PRIMARY INSOMNIA: ICD-10-CM

## 2024-11-18 DIAGNOSIS — F41.9 ANXIETY AND DEPRESSION: ICD-10-CM

## 2024-11-18 RX ORDER — CITALOPRAM 20 MG/1
20 TABLET, FILM COATED ORAL DAILY
Qty: 30 TABLET | Refills: 0 | Status: SHIPPED | OUTPATIENT
Start: 2024-11-18

## 2024-11-18 RX ORDER — ZOLPIDEM TARTRATE 10 MG/1
10 TABLET ORAL NIGHTLY PRN
Qty: 30 TABLET | Refills: 2 | Status: SHIPPED | OUTPATIENT
Start: 2024-11-18

## 2024-11-18 RX ORDER — ALPRAZOLAM 0.5 MG/1
0.5 TABLET ORAL DAILY PRN
Qty: 30 TABLET | Refills: 0 | Status: SHIPPED | OUTPATIENT
Start: 2024-11-18

## 2025-01-28 PROBLEM — Q07.00 ARNOLD-CHIARI SYNDROME WITHOUT SPINA BIFIDA OR HYDROCEPHALUS: Status: ACTIVE | Noted: 2025-01-28

## 2025-01-29 ENCOUNTER — OFFICE VISIT (OUTPATIENT)
Dept: FAMILY MEDICINE | Facility: CLINIC | Age: 45
End: 2025-01-29
Payer: COMMERCIAL

## 2025-01-29 VITALS
HEIGHT: 67 IN | BODY MASS INDEX: 19.84 KG/M2 | HEART RATE: 75 BPM | DIASTOLIC BLOOD PRESSURE: 68 MMHG | SYSTOLIC BLOOD PRESSURE: 104 MMHG | WEIGHT: 126.44 LBS | TEMPERATURE: 98 F | OXYGEN SATURATION: 97 %

## 2025-01-29 DIAGNOSIS — K62.5 RECTAL BLEEDING: Primary | ICD-10-CM

## 2025-01-29 DIAGNOSIS — Q07.00 ARNOLD-CHIARI SYNDROME WITHOUT SPINA BIFIDA OR HYDROCEPHALUS: ICD-10-CM

## 2025-01-29 PROCEDURE — G2211 COMPLEX E/M VISIT ADD ON: HCPCS | Mod: S$GLB,,, | Performed by: REGISTERED NURSE

## 2025-01-29 PROCEDURE — 3074F SYST BP LT 130 MM HG: CPT | Mod: CPTII,S$GLB,, | Performed by: REGISTERED NURSE

## 2025-01-29 PROCEDURE — 1159F MED LIST DOCD IN RCRD: CPT | Mod: CPTII,S$GLB,, | Performed by: REGISTERED NURSE

## 2025-01-29 PROCEDURE — 99213 OFFICE O/P EST LOW 20 MIN: CPT | Mod: S$GLB,,, | Performed by: REGISTERED NURSE

## 2025-01-29 PROCEDURE — 99999 PR PBB SHADOW E&M-EST. PATIENT-LVL IV: CPT | Mod: PBBFAC,,, | Performed by: REGISTERED NURSE

## 2025-01-29 PROCEDURE — 1160F RVW MEDS BY RX/DR IN RCRD: CPT | Mod: CPTII,S$GLB,, | Performed by: REGISTERED NURSE

## 2025-01-29 PROCEDURE — 3008F BODY MASS INDEX DOCD: CPT | Mod: CPTII,S$GLB,, | Performed by: REGISTERED NURSE

## 2025-01-29 PROCEDURE — 3078F DIAST BP <80 MM HG: CPT | Mod: CPTII,S$GLB,, | Performed by: REGISTERED NURSE

## 2025-01-29 NOTE — PROGRESS NOTES
Oralia Cruz  MRN:  [id number]  44 y.o. female      PCP:  Andreea Ferrer MD    Patient Care Team:  Andreea Ferrer MD as PCP - General (Family Medicine)  Sumanth Kingsley NP as Nurse Practitioner (Family Medicine)      SUBJECTIVE:     CHIEF COMPLAINT:  Concern about blood noticed when wiping after a bowel movement.    HPI:  Ms. Cruz noticed blood when wiping after a bowel movement one time recently. She initially attributed it to her menstrual period but realized the blood was not of vaginal origin. This occurred once and has not recurred. Her menstrual period started shortly after this incident. Ms. Cruz denies anal itching, burning, or pain, and reports no changes in stool consistency or difficulty with bowel movements. The blood was bright red and minimal in amount, only visible on toilet paper. This is a first-time occurrence for the patient.    Ms. Cruz denies a family history of colon cancer, Crohn's disease, ulcerative colitis, diverticulitis or any other colon/rectal issues.      Review of Systems   Constitutional: Negative.    Gastrointestinal:  Negative for abdominal pain, blood in stool, constipation, diarrhea, melena, nausea and vomiting.        + slight BRB on tissue after wiping post-BM   Skin: Negative.    Neurological: Negative.          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      OBJECTIVE:     Vitals:    01/29/25 0822   BP: 104/68   BP Location: Right arm   Pulse: 75   Temp: 98.1 °F (36.7 °C)   TempSrc: Tympanic   SpO2: 97%   Weight: 57.3 kg (126 lb 6.9 oz)   Height: 5' 7" (1.702 m)     Vitals:    01/29/25 0822   PainSc: 0-No pain       Physical Exam  Vitals reviewed.   Constitutional:       General: She is not in acute distress.  Genitourinary:     Comments: Declines rectal exam today  Neurological:      Mental Status: She is alert.         ASSESSMENT:     1. Rectal bleeding ------ poss hemorrhoid issue or anal tear, declines exam today.  Will check OBS for now.  -     Occult blood x 1, stool; Future; Expected date: 01/29/2025    2. 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  Stable.      PLAN:     OBS ordered, pending.  Rectal care discussed.  To GI or Colorectal if issue recurs or persists.  RTC as directed and/or prn.        SHEILA Hodges  [facility]         "

## 2025-02-24 ENCOUNTER — PATIENT MESSAGE (OUTPATIENT)
Dept: FAMILY MEDICINE | Facility: CLINIC | Age: 45
End: 2025-02-24
Payer: COMMERCIAL

## 2025-02-24 DIAGNOSIS — Z76.0 MEDICATION REFILL: ICD-10-CM

## 2025-02-24 DIAGNOSIS — F32.A ANXIETY AND DEPRESSION: ICD-10-CM

## 2025-02-24 DIAGNOSIS — F90.0 ATTENTION DEFICIT HYPERACTIVITY DISORDER (ADHD), PREDOMINANTLY INATTENTIVE TYPE: ICD-10-CM

## 2025-02-24 DIAGNOSIS — F41.9 ANXIETY AND DEPRESSION: ICD-10-CM

## 2025-02-24 DIAGNOSIS — F51.01 PRIMARY INSOMNIA: ICD-10-CM

## 2025-02-24 DIAGNOSIS — G43.109 MIGRAINE WITH AURA AND WITHOUT STATUS MIGRAINOSUS, NOT INTRACTABLE: ICD-10-CM

## 2025-02-24 RX ORDER — BUTALBITAL, ACETAMINOPHEN AND CAFFEINE 50; 325; 40 MG/1; MG/1; MG/1
1 TABLET ORAL EVERY 6 HOURS PRN
Qty: 30 TABLET | Refills: 1 | Status: SHIPPED | OUTPATIENT
Start: 2025-02-24

## 2025-02-24 RX ORDER — DEXTROAMPHETAMINE SACCHARATE, AMPHETAMINE ASPARTATE, DEXTROAMPHETAMINE SULFATE AND AMPHETAMINE SULFATE 5; 5; 5; 5 MG/1; MG/1; MG/1; MG/1
1 TABLET ORAL DAILY
Qty: 30 TABLET | Refills: 0 | OUTPATIENT
Start: 2025-02-24

## 2025-02-24 RX ORDER — CITALOPRAM 20 MG/1
20 TABLET, FILM COATED ORAL DAILY
Qty: 30 TABLET | Refills: 0 | Status: SHIPPED | OUTPATIENT
Start: 2025-02-24

## 2025-02-24 RX ORDER — ALPRAZOLAM 0.5 MG/1
0.5 TABLET ORAL DAILY PRN
Qty: 30 TABLET | Refills: 0 | Status: SHIPPED | OUTPATIENT
Start: 2025-02-24

## 2025-02-24 RX ORDER — ZOLPIDEM TARTRATE 10 MG/1
10 TABLET ORAL NIGHTLY PRN
Qty: 30 TABLET | Refills: 0 | Status: SHIPPED | OUTPATIENT
Start: 2025-02-24

## 2025-02-24 NOTE — TELEPHONE ENCOUNTER
No care due was identified.  [facility] Embedded Care Due Messages. Reference number: [id number].   2/24/2025 6:20:55 AM CST

## 2025-02-25 NOTE — TELEPHONE ENCOUNTER
Spoke to patient she is aware she needs an appointment with provider. Pt states she has scheduled an ANNUAL appointment 5/6/2025 for 8am. But is still in need of medication do to her going out of town. Pt states she will schedule a virtual appointment with provider to at least get some medication.

## 2025-02-27 ENCOUNTER — OFFICE VISIT (OUTPATIENT)
Dept: FAMILY MEDICINE | Facility: CLINIC | Age: 45
End: 2025-02-27
Payer: COMMERCIAL

## 2025-02-27 VITALS — SYSTOLIC BLOOD PRESSURE: 106 MMHG | DIASTOLIC BLOOD PRESSURE: 78 MMHG

## 2025-02-27 DIAGNOSIS — F90.0 ATTENTION DEFICIT HYPERACTIVITY DISORDER (ADHD), PREDOMINANTLY INATTENTIVE TYPE: Primary | ICD-10-CM

## 2025-02-27 DIAGNOSIS — F32.A ANXIETY AND DEPRESSION: ICD-10-CM

## 2025-02-27 DIAGNOSIS — F41.9 ANXIETY AND DEPRESSION: ICD-10-CM

## 2025-02-27 DIAGNOSIS — F51.01 PRIMARY INSOMNIA: ICD-10-CM

## 2025-02-27 DIAGNOSIS — F90.0 ATTENTION DEFICIT HYPERACTIVITY DISORDER (ADHD), PREDOMINANTLY INATTENTIVE TYPE: ICD-10-CM

## 2025-02-27 RX ORDER — DEXTROAMPHETAMINE SACCHARATE, AMPHETAMINE ASPARTATE, DEXTROAMPHETAMINE SULFATE AND AMPHETAMINE SULFATE 5; 5; 5; 5 MG/1; MG/1; MG/1; MG/1
1 TABLET ORAL DAILY
Qty: 30 TABLET | Refills: 0 | Status: SHIPPED | OUTPATIENT
Start: 2025-02-27

## 2025-02-27 NOTE — PROGRESS NOTES
The patient location is:  At work  The chief complaint leading to consultation is:  Follow-up chronic medical conditions including ADHD inattentive type    Visit type: audiovisual    Face to Face time with patient:  12 minutes  20 minutes of total time spent on the encounter, which includes face to face time and non-face to face time preparing to see the patient (eg, review of tests), Obtaining and/or reviewing separately obtained history, Documenting clinical information in the electronic or other health record, Independently interpreting results (not separately reported) and communicating results to the patient/family/caregiver, or Care coordination (not separately reported).     Each patient to whom he or she provides medical services by telemedicine is:  (1) informed of the relationship between the physician and patient and the respective role of any other health care provider with respect to management of the patient; and (2) notified that he or she may decline to receive medical services by telemedicine and may withdraw from such care at any time.      CHIEF COMPLAINT:  This is a 44-year-old female here for follow-up ADHD inattentive type.     SUBJECTIVE: Patient requests refill of her medications.  She  has depression with anxiety for which she has taken Celexa 20 mg daily since 2014.  She previously took Wellbutrin which did not work well.  She takes alprazolam 0.5 mg 1/2 tablet at bedtime in order to calm her mind to sleep.  She occasionally takes alprazolam 0.5 mg 1/2 tablet during the day if needed.  She also takes Ambien 10 mg nightly for insomnia.  Patient was diagnosed with ADHD inattentive type in elementary school.  She takes Adderall 20 mg daily.  Medication is effective in keeping her focus and Adderall helps with her depression.  Patient denies side effects such as jitteriness, tachycardia, sleeplessness, anorexia or diarrhea.    Patient has a history of migraines which occur 2 to 3 times a month.   She treats migraines with Fioricet as needed.  She has Arnold-Chiari malformation for which she had surgery in 2004.       ROS:  GENERAL: Patient denies fever, chills, night sweats.  Patient denies weight gain or loss. Patient denies anorexia, fatigue, weakness or swollen glands.  SKIN: Patient denies rash or hair loss.  HEENT: Patient denies sore throat, ear pain, hearing loss, nasal congestion, or runny nose. Patient denies visual disturbance, eye irritation or discharge.  LUNGS: Patient denies cough, wheeze or hemoptysis.  CARDIOVASCULAR: Patient denies chest pain, shortness of breath, palpitations, syncope or lower extremity edema.  GI: Patient denies abdominal pain, nausea, vomiting, diarrhea, constipation, blood in stool or melena.  GENITOURINARY: Patient denies pelvic pain, vaginal discharge, itch or odor. Patient denies irregular vaginal bleeding.  Patient denies dysuria, frequency, hematuria, nocturia, urgency or incontinence.  BREASTS: Patient denies breast pain, mass or nipple discharge.  MUSCULOSKELETAL: Patient denies joint pain, swelling, redness or warmth.  NEUROLOGIC: Patient denies headache, vertigo, paresthesias, weakness in limb, dysarthria, dysphagia or abnormality of gait.  PSYCHIATRIC: Patient denies anxiety, depression or memory loss.     OBJECTIVE:   GENERAL: Well-developed well-nourished pleasant white female alert and oriented x3, in no acute distress.  Memory, judgment and cognition without deficit.  Normal affect.  No hallucinations, delusions or flight of ideas.  SKIN: Clear without rash. Normal color and tone.  HEENT: Eyes: Clear conjunctivae. No scleral icterus.  Nose:  Not congested-sounding.    NECK: Supple, normal range of motion.  LUNGS:  Normal respiratory effort.  No audible wheezing.  EXTREMITIES:  Normal range of motion in all extremities.  NEUROLOGIC:  Gait without abnormality.  No tremor.    ASSESSMENT:  1. Attention deficit hyperactivity disorder (ADHD), predominantly  inattentive type    2. Anxiety and depression    3. Primary insomnia    4. Attention deficit hyperactivity disorder (ADHD), predominantly inattentive type      PLAN:  1. Refill all medications including Adderall.    2. Return to clinic in 3 months for preventive health exam.    This note is generated with speech recognition software and is subject to transcription error and sound alike phrases that may be missed by proofreading.

## 2025-03-26 DIAGNOSIS — F51.01 PRIMARY INSOMNIA: ICD-10-CM

## 2025-03-26 DIAGNOSIS — F41.9 ANXIETY AND DEPRESSION: ICD-10-CM

## 2025-03-26 DIAGNOSIS — Z76.0 MEDICATION REFILL: ICD-10-CM

## 2025-03-26 DIAGNOSIS — F32.A ANXIETY AND DEPRESSION: ICD-10-CM

## 2025-03-26 RX ORDER — ALPRAZOLAM 0.5 MG/1
TABLET ORAL
Qty: 30 TABLET | Refills: 1 | Status: SHIPPED | OUTPATIENT
Start: 2025-03-26

## 2025-03-26 RX ORDER — ZOLPIDEM TARTRATE 10 MG/1
TABLET ORAL
Qty: 30 TABLET | Refills: 1 | Status: SHIPPED | OUTPATIENT
Start: 2025-03-26

## 2025-03-26 NOTE — TELEPHONE ENCOUNTER
No care due was identified.  [facility] Embedded Care Due Messages. Reference number: [id number].   3/26/2025 3:54:12 PM CDT

## 2025-04-22 ENCOUNTER — OFFICE VISIT (OUTPATIENT)
Dept: FAMILY MEDICINE | Facility: CLINIC | Age: 45
End: 2025-04-22
Payer: COMMERCIAL

## 2025-04-22 VITALS
HEIGHT: 67 IN | DIASTOLIC BLOOD PRESSURE: 70 MMHG | WEIGHT: 128.5 LBS | BODY MASS INDEX: 20.17 KG/M2 | HEART RATE: 66 BPM | TEMPERATURE: 98 F | SYSTOLIC BLOOD PRESSURE: 100 MMHG | OXYGEN SATURATION: 100 % | RESPIRATION RATE: 20 BRPM

## 2025-04-22 DIAGNOSIS — Z00.00 PREVENTATIVE HEALTH CARE: Primary | ICD-10-CM

## 2025-04-22 DIAGNOSIS — F90.0 ATTENTION DEFICIT HYPERACTIVITY DISORDER (ADHD), PREDOMINANTLY INATTENTIVE TYPE: ICD-10-CM

## 2025-04-22 DIAGNOSIS — Z23 NEED FOR VACCINATION: ICD-10-CM

## 2025-04-22 DIAGNOSIS — F41.9 ANXIETY AND DEPRESSION: ICD-10-CM

## 2025-04-22 DIAGNOSIS — F32.A ANXIETY AND DEPRESSION: ICD-10-CM

## 2025-04-22 DIAGNOSIS — G43.109 MIGRAINE WITH AURA AND WITHOUT STATUS MIGRAINOSUS, NOT INTRACTABLE: ICD-10-CM

## 2025-04-22 DIAGNOSIS — Z12.31 ENCOUNTER FOR SCREENING MAMMOGRAM FOR MALIGNANT NEOPLASM OF BREAST: ICD-10-CM

## 2025-04-22 DIAGNOSIS — F51.01 PRIMARY INSOMNIA: ICD-10-CM

## 2025-04-22 PROCEDURE — 99999 PR PBB SHADOW E&M-EST. PATIENT-LVL III: CPT | Mod: PBBFAC,,, | Performed by: FAMILY MEDICINE

## 2025-04-22 PROCEDURE — 3078F DIAST BP <80 MM HG: CPT | Mod: CPTII,S$GLB,, | Performed by: FAMILY MEDICINE

## 2025-04-22 PROCEDURE — 90715 TDAP VACCINE 7 YRS/> IM: CPT | Mod: S$GLB,,, | Performed by: FAMILY MEDICINE

## 2025-04-22 PROCEDURE — 3074F SYST BP LT 130 MM HG: CPT | Mod: CPTII,S$GLB,, | Performed by: FAMILY MEDICINE

## 2025-04-22 PROCEDURE — 99396 PREV VISIT EST AGE 40-64: CPT | Mod: 25,S$GLB,, | Performed by: FAMILY MEDICINE

## 2025-04-22 PROCEDURE — 90471 IMMUNIZATION ADMIN: CPT | Mod: S$GLB,,, | Performed by: FAMILY MEDICINE

## 2025-04-22 PROCEDURE — 3008F BODY MASS INDEX DOCD: CPT | Mod: CPTII,S$GLB,, | Performed by: FAMILY MEDICINE

## 2025-04-22 PROCEDURE — 1159F MED LIST DOCD IN RCRD: CPT | Mod: CPTII,S$GLB,, | Performed by: FAMILY MEDICINE

## 2025-04-22 RX ORDER — DEXTROAMPHETAMINE SACCHARATE, AMPHETAMINE ASPARTATE, DEXTROAMPHETAMINE SULFATE AND AMPHETAMINE SULFATE 5; 5; 5; 5 MG/1; MG/1; MG/1; MG/1
1 TABLET ORAL DAILY
Qty: 30 TABLET | Refills: 0 | Status: SHIPPED | OUTPATIENT
Start: 2025-04-22

## 2025-04-22 RX ORDER — CITALOPRAM 20 MG/1
20 TABLET, FILM COATED ORAL DAILY
Qty: 30 TABLET | Refills: 0 | Status: SHIPPED | OUTPATIENT
Start: 2025-04-22

## 2025-04-22 RX ORDER — ALPRAZOLAM 0.5 MG/1
0.5 TABLET ORAL 2 TIMES DAILY PRN
Qty: 30 TABLET | Refills: 3 | Status: SHIPPED | OUTPATIENT
Start: 2025-04-22

## 2025-04-22 RX ORDER — ZOLPIDEM TARTRATE 10 MG/1
10 TABLET ORAL NIGHTLY PRN
Qty: 30 TABLET | Refills: 5 | Status: SHIPPED | OUTPATIENT
Start: 2025-04-22
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
## 2025-04-25 DIAGNOSIS — G43.109 MIGRAINE WITH AURA AND WITHOUT STATUS MIGRAINOSUS, NOT INTRACTABLE: ICD-10-CM

## 2025-04-25 DIAGNOSIS — Z76.0 MEDICATION REFILL: ICD-10-CM

## 2025-04-28 RX ORDER — BUTALBITAL, ACETAMINOPHEN AND CAFFEINE 50; 325; 40 MG/1; MG/1; MG/1
TABLET ORAL
Qty: 30 TABLET | Refills: 1 | Status: SHIPPED | OUTPATIENT
Start: 2025-04-28

## 2025-06-09 DIAGNOSIS — F41.9 ANXIETY AND DEPRESSION: ICD-10-CM

## 2025-06-09 DIAGNOSIS — F32.A ANXIETY AND DEPRESSION: ICD-10-CM

## 2025-06-09 RX ORDER — CITALOPRAM 20 MG/1
TABLET ORAL
Qty: 90 TABLET | Refills: 3 | Status: SHIPPED | OUTPATIENT
Start: 2025-06-09

## 2025-06-09 NOTE — TELEPHONE ENCOUNTER
No care due was identified.  [facility] Embedded Care Due Messages. Reference number: [id number].   6/09/2025 2:55:36 PM CDT

## 2025-06-10 NOTE — TELEPHONE ENCOUNTER
Refill Decision Note   Oralia Cruz  is requesting a refill authorization.  Brief Assessment and Rationale for Refill:  Approve     Medication Therapy Plan:         Comments:     Note composed:8:56 PM 06/09/2025

## 2025-07-17 DIAGNOSIS — F41.9 ANXIETY AND DEPRESSION: ICD-10-CM

## 2025-07-17 DIAGNOSIS — F32.A ANXIETY AND DEPRESSION: ICD-10-CM

## 2025-07-17 NOTE — TELEPHONE ENCOUNTER
No care due was identified.  [facility] Embedded Care Due Messages. Reference number: [id number].   7/17/2025 5:45:08 PM CDT

## 2025-07-17 NOTE — TELEPHONE ENCOUNTER
Refill Routing Note   Medication(s) are not appropriate for processing by [facility] for the following reason(s):        Outside of protocol    [facility] action(s):  Route             Appointments  past 12m or future 3m with PCP    Date Provider   Last Visit   4/22/2025 Andreea Ferrer MD   Next Visit   Visit date not found Andreea Ferrer MD   ED visits in past 90 days: 0        Note composed:6:34 PM 07/17/2025

## 2025-07-20 RX ORDER — DEXTROAMPHETAMINE SACCHARATE, AMPHETAMINE ASPARTATE, DEXTROAMPHETAMINE SULFATE AND AMPHETAMINE SULFATE 5; 5; 5; 5 MG/1; MG/1; MG/1; MG/1
1 TABLET ORAL DAILY
Qty: 30 TABLET | Refills: 0 | Status: SHIPPED | OUTPATIENT
Start: 2025-07-20

## 2025-08-24 DIAGNOSIS — F32.A ANXIETY AND DEPRESSION: ICD-10-CM

## 2025-08-24 DIAGNOSIS — F41.9 ANXIETY AND DEPRESSION: ICD-10-CM

## 2025-08-26 RX ORDER — DEXTROAMPHETAMINE SACCHARATE, AMPHETAMINE ASPARTATE, DEXTROAMPHETAMINE SULFATE AND AMPHETAMINE SULFATE 5; 5; 5; 5 MG/1; MG/1; MG/1; MG/1
1 TABLET ORAL DAILY
Qty: 30 TABLET | Refills: 0 | Status: SHIPPED | OUTPATIENT
Start: 2025-08-26